# Patient Record
Sex: FEMALE | Race: WHITE | Employment: FULL TIME | URBAN - METROPOLITAN AREA
[De-identification: names, ages, dates, MRNs, and addresses within clinical notes are randomized per-mention and may not be internally consistent; named-entity substitution may affect disease eponyms.]

---

## 2017-01-05 ENCOUNTER — OFFICE VISIT (OUTPATIENT)
Dept: UROLOGY | Facility: CLINIC | Age: 27
End: 2017-01-05
Payer: COMMERCIAL

## 2017-01-05 DIAGNOSIS — N39.0 RECURRENT UTI: ICD-10-CM

## 2017-01-05 DIAGNOSIS — R30.0 DYSURIA: Primary | ICD-10-CM

## 2017-01-05 LAB
ALBUMIN UR-MCNC: NEGATIVE MG/DL
APPEARANCE UR: CLEAR
BILIRUB UR QL STRIP: NEGATIVE
COLOR UR AUTO: NORMAL
GLUCOSE UR STRIP-MCNC: NEGATIVE MG/DL
HGB UR QL STRIP: NEGATIVE
KETONES UR STRIP-MCNC: NEGATIVE MG/DL
LEUKOCYTE ESTERASE UR QL STRIP: NEGATIVE
NITRATE UR QL: NEGATIVE
PH UR STRIP: 7 PH (ref 5–7)
SP GR UR STRIP: 1 (ref 1–1.03)
URN SPEC COLLECT METH UR: NORMAL
UROBILINOGEN UR STRIP-MCNC: NORMAL MG/DL (ref 0–2)

## 2017-01-05 PROCEDURE — 81003 URINALYSIS AUTO W/O SCOPE: CPT | Performed by: UROLOGY

## 2017-01-05 PROCEDURE — 99214 OFFICE O/P EST MOD 30 MIN: CPT | Performed by: UROLOGY

## 2017-01-05 PROCEDURE — 51798 US URINE CAPACITY MEASURE: CPT | Performed by: UROLOGY

## 2017-01-05 RX ORDER — PHENAZOPYRIDINE HYDROCHLORIDE 200 MG/1
200 TABLET, FILM COATED ORAL 3 TIMES DAILY PRN
Qty: 9 TABLET | Refills: 0 | Status: SHIPPED | OUTPATIENT
Start: 2017-01-05

## 2017-01-05 RX ORDER — METFORMIN HCL 500 MG
1 TABLET, EXTENDED RELEASE 24 HR ORAL 2 TIMES DAILY
COMMUNITY
Start: 2016-12-21

## 2017-01-05 RX ORDER — AMOXICILLIN 500 MG/1
1 CAPSULE ORAL 2 TIMES DAILY
COMMUNITY
Start: 2016-12-13

## 2017-01-05 NOTE — PROGRESS NOTES
Reason for Visit: f/u on urinary symptoms.   Clinical Data: Ms. Mayra Gregory is a 26 year old female with a hx of recurrent uti's. She had a hx of urethral dilation in 2010. And prior to that she had a bartholin cyst removed and a catheter placed. Since then she has had recurrent uti's. Cystoscopy in Sept of 2013 was wnl. At that time she was started on methenamine and had been UTI free until Aug 2014 when UTI's restarted. Pt had 2 UTIs between then and May 2015, both growing E.Coli and she was treated with Cipro.   We had started her on Bactrim prophylaxis in Sept 2014.  She had been doing well until January when she became symptomatic, th again in April 2015 so in May of 2015 she was switched to keflex 250 mg nightly for prophylaxis.  She has not had any infections since then.    CT in 2012 and no reflux on video portion of her UDS in 2013. Pt  MRI of the pelvis 10/21/14 showed not periurethral diverticulum, just small bilateral Bartholin gland cysts  RU today 23cc on bladder scan by nursing.  A/P: 26 year old female with recurrent uti's with no anatomical reason who had been doing well on bactrim prophylaxis until beginning of 2015 and then was changed to Keflex. We discussed other options of continuing with low dose antibiotic vs Ellura.  As well as probiotics. The pt. elected to continue with the keflex for now as she is trying to get pregnant and does not want the added complication of uti's.  -contine keflex   -consider Probiotics  -f/u in 6 months  Thank you for allowing me to participate in the care of Ms. Mayra Gregory and I will keep you updated on her progress.   Moses Mclean MD  25 min spent with patient,  >50% in discussion and coordination of care.

## 2017-01-05 NOTE — NURSING NOTE
"Mayra Teresa's goals for this visit include:   Chief Complaint   Patient presents with     RECHECK     annual recheck - recurrent UTI     She requests these members of her care team be copied on today's visit information: NONE    Initial There were no vitals taken for this visit. Estimated body mass index is 23.16 kg/(m^2) as calculated from the following:    Height as of 9/10/14: 1.626 m (5' 4\").    Weight as of 9/10/14: 61.236 kg (135 lb).  BP completed using cuff size: NA (Not Taken)    post void residual - 23cc      "